# Patient Record
(demographics unavailable — no encounter records)

---

## 2024-10-15 NOTE — PROCEDURE
[FreeTextEntry1] : See scanned PFT report = report reviewed in detail with patient Normal spirometry; no flattening of flow-volume loop noted; no bronchodilator response noted; normal lung volumes; moderate reduction in DLCO which corrects for [facility]

## 2024-10-15 NOTE — REVIEW OF SYSTEMS
[Recent Wt Gain (___ Lbs)] : ~T recent [unfilled] lb weight gain [Recent Wt Loss (___ Lbs)] : ~T recent [unfilled] lb weight loss [SOB on Exertion] : sob on exertion [GERD] : gerd [Menopause] : menopause [Obesity] : obesity [Cough] : cough

## 2024-10-15 NOTE — HISTORY OF PRESENT ILLNESS
[Never] : never [Wheezing] : wheezing [Nasal Passage Blockage (Stuffiness)] : edema [Nonspecific Pain, Swelling, And Stiffness] : chest pain [Fever] : fever [Difficulty Breathing During Exertion] : dyspnea on exertion [Feelings Of Weakness On Exertion] : exercise intolerance [Wt Loss ___ kg] : Recent [unfilled] kg(s) weight loss [Does not check] : The patient is not checking peak flow at home [2  -  Slight] : 2, slight [Class II - Mild Symptoms and Slight Limitations] : II [Reviewed no changes] : Reviewed no changes [Cough] : coughing [Wt Gain ___ kg] : No recent weight gain [More Frequent Use Needed Recently] : Patient reports no recent increase in frequency of [TextBox_4] : 74-year-old female comes in today for initial pulmonary evaluation for management of asthma. 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  She denies any history of pneumonia or pleurisy.  She reports that when growing up she was frequently ill with bronchitis.  However her bouts of bronchitis have stopped over the last 5 years.  She denies any history of tuberculosis, COPD, DVT, pulmonary embolism, lung cancer, or pneumothorax.  She reports that she was diagnosed with sleep apnea in 1991.  She is compliant with the use of a nocturnal appliance with sleep.  She denies any history of prior lung surgery.  She reports that she has had asthma since childhood.  She states that she has been aware of this diagnosis for at least 10 to 15 years.  She has never required intubation or hospitalization for asthma management.  She reports that she was once evaluated in the emergency room and treated for active asthma.  Her asthma tends to trigger in the setting of acute upper respiratory illnesses.  She does not feel that her asthma is triggered by allergens, exercise, pet dander, odors, smoke.  She is presently maintained on daily Singulair and Breo Ellipta.  She reports no excess usage of her rescue inhaler.  She reports that she is single.  She is a retired .  She denies any exposure to radiation, chemicals, or dusts such as asbestos.  She denies tobacco use.  She drinks alcohol socially.  She denies any drug use.  She has had recent travel to [place] over the last year.  She denies any pets in her home.  She reports that she is up-to-date with influenza, pneumococcal, and COVID vaccination.  She has not yet had RSV vaccination.  She is unsure of the timing of her last chest x-ray.  She does not believe that she has ever had a chest CT.  She reports PFTs 1 year ago.  She denies any edema, orthopnea, or PND.  She denies any nocturnal asthma.  She underwent bariatric surgery and has had 100 pound weight loss since 2008.  She denies any recent fevers/chills/sweats.  She denies any chest pain or palpitations.  She denies any chronic cough or hemoptysis.  She notices dyspnea on exertion with inclines.  Otherwise she denies any other dyspnea.  She has not had any recent wheezing and feels that her asthma is fairly well-controlled.  April 18, 2024 visit: Doing well.  Denies any chest pain.  Denies any cough or hemoptysis.  Denies any recent fevers or chills.  Weight stable.  Denies any increased shortness of breath or wheezing.  No other complaints today.   October 15, 2024 visit: Had been doing very well.  Developed recent acute URI.  Was seen in urgent care.  Treated with a course of doxycycline.  Feeling improved.  Continues to have deep barking cough intermittently.  Denies any sputum production or hemoptysis.  Denies any chest pain.  Denies any fevers or chills.  Has been dieting with weight loss.  Denies any edema or calf pain.  Asthma has been fairly stable without increased shortness of breath or wheezing.  No other complaints today. [de-identified] : Denies hemoptysis [ESS] : 0 [TextBox_42] : 12/23 [AdvancecareDate] : 10/24

## 2024-10-15 NOTE — PHYSICAL EXAM
[No Acute Distress] : no acute distress [Well Nourished] : well nourished [Well Groomed] : well groomed [Well Developed] : well developed [Normal Oropharynx] : normal oropharynx [Supple] : supple [No JVD] : no jvd [Normal Rate/Rhythm] : normal rate/rhythm [Normal S1, S2] : normal s1, s2 [No Resp Distress] : no resp distress [No Acc Muscle Use] : no acc muscle use [Normal Rhythm and Effort] : normal rhythm and effort [Clear to Auscultation Bilaterally] : clear to auscultation bilaterally [No Abnormalities] : no abnormalities [Benign] : benign [Normal Gait] : normal gait [No Clubbing] : no clubbing [No Cyanosis] : no cyanosis [No Edema] : no edema [FROM] : FROM [Normal Color/ Pigmentation] : normal color/ pigmentation [No Focal Deficits] : no focal deficits [Oriented x3] : oriented x3 [Normal Affect] : normal affect [TextBox_11] : No sinus tenderness; pupils reactive to light; extraocular muscles intact [TextBox_44] : No stridor noted [TextBox_54] : No cords [TextBox_68] : R = 16; good air entry; no wheezing noted

## 2024-10-15 NOTE — ASSESSMENT
[FreeTextEntry1] : Asthma Clinically stable despite recent URI Had recent chest CT = no evidence of bronchiectasis Had full PFTs today = see scanned report = report reviewed with patient Never smoker Vaccines reviewed Consider pulmonary rehab Continue weight control Cardiology follow-up Continue Breo Ellipta 1 puff daily Continue daily Singulair Carry albuterol MDI for as needed use  Recent acute URI Now with persistent postviral cough Chest x-ray declined Has completed course of doxycycline Wishes trial of benzonatate 100 mg 3 times daily as needed to control cough Will take prednisone 20 mg daily in a.m. with food for 5 days Low threshold to use nebulizer therapy She will call if her cough persists despite treatment   YEYO Uses nocturnal appliance Continue weight control Avoid alcohol Sleep with head of bed elevated  She wishes to return in follow-up within the next 4-6 months and as needed Will do PFTs with next visit She will call if her status changes or worsens or for any pulmonary issues and return to be seen immediately All of the above was discussed in detail with her and all questions were answered She verbally confirmed understanding of all of the above and agreement with the above plan

## 2025-04-15 NOTE — PROCEDURE
[FreeTextEntry1] : See scanned PFT report = report reviewed in detail with patient Normal spirometry; no flattening of flow-volume loop noted; normal lung volumes; moderate reduction in DLCO which corrects for VA

## 2025-04-15 NOTE — HISTORY OF PRESENT ILLNESS
[Never] : never [Wheezing] : wheezing [Nasal Passage Blockage (Stuffiness)] : edema [Nonspecific Pain, Swelling, And Stiffness] : chest pain [Fever] : fever [Difficulty Breathing During Exertion] : dyspnea on exertion [Feelings Of Weakness On Exertion] : exercise intolerance [Does not check] : The patient is not checking peak flow at home [2  -  Slight] : 2, slight [Class II - Mild Symptoms and Slight Limitations] : II [Reviewed no changes] : Reviewed no changes [Cough] : coughing [Wt Gain ___ kg] : No recent weight gain [Wt Loss ___ kg] : No recent weight loss [More Frequent Use Needed Recently] : Patient reports no recent increase in frequency of [TextBox_4] : 74-year-old female comes in today for initial pulmonary evaluation for management of asthma. 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  She denies any history of pneumonia or pleurisy.  She reports that when growing up she was frequently ill with bronchitis.  However her bouts of bronchitis have stopped over the last 5 years.  She denies any history of tuberculosis, COPD, DVT, pulmonary embolism, lung cancer, or pneumothorax.  She reports that she was diagnosed with sleep apnea in 1991.  She is compliant with the use of a nocturnal appliance with sleep.  She denies any history of prior lung surgery.  She reports that she has had asthma since childhood.  She states that she has been aware of this diagnosis for at least 10 to 15 years.  She has never required intubation or hospitalization for asthma management.  She reports that she was once evaluated in the emergency room and treated for active asthma.  Her asthma tends to trigger in the setting of acute upper respiratory illnesses.  She does not feel that her asthma is triggered by allergens, exercise, pet dander, odors, smoke.  She is presently maintained on daily Singulair and Breo Ellipta.  She reports no excess usage of her rescue inhaler.  She reports that she is single.  She is a retired .  She denies any exposure to radiation, chemicals, or dusts such as asbestos.  She denies tobacco use.  She drinks alcohol socially.  She denies any drug use.  She has had recent travel to [place] over the last year.  She denies any pets in her home.  She reports that she is up-to-date with influenza, pneumococcal, and COVID vaccination.  She has not yet had RSV vaccination.  She is unsure of the timing of her last chest x-ray.  She does not believe that she has ever had a chest CT.  She reports PFTs 1 year ago.  She denies any edema, orthopnea, or PND.  She denies any nocturnal asthma.  She underwent bariatric surgery and has had 100 pound weight loss since 2008.  She denies any recent fevers/chills/sweats.  She denies any chest pain or palpitations.  She denies any chronic cough or hemoptysis.  She notices dyspnea on exertion with inclines.  Otherwise she denies any other dyspnea.  She has not had any recent wheezing and feels that her asthma is fairly well-controlled.  April 18, 2024 visit: Doing well.  Denies any chest pain.  Denies any cough or hemoptysis.  Denies any recent fevers or chills.  Weight stable.  Denies any increased shortness of breath or wheezing.  No other complaints today.   October 15, 2024 visit: Had been doing very well.  Developed recent acute URI.  Was seen in urgent care.  Treated with a course of doxycycline.  Feeling improved.  Continues to have deep barking cough intermittently.  Denies any sputum production or hemoptysis.  Denies any chest pain.  Denies any fevers or chills.  Has been dieting with weight loss.  Denies any edema or calf pain.  Asthma has been fairly stable without increased shortness of breath or wheezing.  No other complaints today.  April 15, 2025 visit: Doing well.  Denies any chest pain.  Denies any cough/sputum production/hemoptysis.  Denies any recent fevers or chills.  Denies any edema or calf pain.  Reports that her asthma has been stable without any increased shortness of breath or wheezing.  Offers no other complaints today. [de-identified] : Denies hemoptysis [ESS] : 0 [TextBox_42] : 12/23 [AdvancecareDate] : 04/25

## 2025-04-15 NOTE — ASSESSMENT
[FreeTextEntry1] : Asthma Clinically stable Monitor chest CT Had full PFTs today = see scanned report = report reviewed with patient Never smoker Vaccines reviewed Consider pulmonary rehab Continue weight control Cardiology follow-up Continue Breo Ellipta 1 puff daily then switch to [place] 1 puff twice daily Continue daily Singulair Carry albuterol MDI for as needed use  YEYO Uses nocturnal appliance Continue weight control Avoid alcohol Sleep with head of bed elevated  She wishes to return in follow-up within the next 4-6 months and as needed Will do PFTs with next visit She will call if her status changes or worsens or for any pulmonary issues and return to be seen immediately All of the above was discussed in detail with her and all questions were answered She verbally confirmed understanding of all of the above and agreement with the above plan

## 2025-04-15 NOTE — HISTORY OF PRESENT ILLNESS
[Never] : never [Wheezing] : wheezing [Nasal Passage Blockage (Stuffiness)] : edema [Nonspecific Pain, Swelling, And Stiffness] : chest pain [Fever] : fever [Difficulty Breathing During Exertion] : dyspnea on exertion [Feelings Of Weakness On Exertion] : exercise intolerance [Does not check] : The patient is not checking peak flow at home [2  -  Slight] : 2, slight [Class II - Mild Symptoms and Slight Limitations] : II [Reviewed no changes] : Reviewed no changes [Cough] : coughing [Wt Gain ___ kg] : No recent weight gain [Wt Loss ___ kg] : No recent weight loss [More Frequent Use Needed Recently] : Patient reports no recent increase in frequency of [TextBox_4] : 74-year-old female comes in today for initial pulmonary evaluation for management of asthma. 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  She denies any history of pneumonia or pleurisy.  She reports that when growing up she was frequently ill with bronchitis.  However her bouts of bronchitis have stopped over the last 5 years.  She denies any history of tuberculosis, COPD, DVT, pulmonary embolism, lung cancer, or pneumothorax.  She reports that she was diagnosed with sleep apnea in 1991.  She is compliant with the use of a nocturnal appliance with sleep.  She denies any history of prior lung surgery.  She reports that she has had asthma since childhood.  She states that she has been aware of this diagnosis for at least 10 to 15 years.  She has never required intubation or hospitalization for asthma management.  She reports that she was once evaluated in the emergency room and treated for active asthma.  Her asthma tends to trigger in the setting of acute upper respiratory illnesses.  She does not feel that her asthma is triggered by allergens, exercise, pet dander, odors, smoke.  She is presently maintained on daily Singulair and Breo Ellipta.  She reports no excess usage of her rescue inhaler.  She reports that she is single.  She is a retired .  She denies any exposure to radiation, chemicals, or dusts such as asbestos.  She denies tobacco use.  She drinks alcohol socially.  She denies any drug use.  She has had recent travel to [place] over the last year.  She denies any pets in her home.  She reports that she is up-to-date with influenza, pneumococcal, and COVID vaccination.  She has not yet had RSV vaccination.  She is unsure of the timing of her last chest x-ray.  She does not believe that she has ever had a chest CT.  She reports PFTs 1 year ago.  She denies any edema, orthopnea, or PND.  She denies any nocturnal asthma.  She underwent bariatric surgery and has had 100 pound weight loss since 2008.  She denies any recent fevers/chills/sweats.  She denies any chest pain or palpitations.  She denies any chronic cough or hemoptysis.  She notices dyspnea on exertion with inclines.  Otherwise she denies any other dyspnea.  She has not had any recent wheezing and feels that her asthma is fairly well-controlled.  April 18, 2024 visit: Doing well.  Denies any chest pain.  Denies any cough or hemoptysis.  Denies any recent fevers or chills.  Weight stable.  Denies any increased shortness of breath or wheezing.  No other complaints today.   October 15, 2024 visit: Had been doing very well.  Developed recent acute URI.  Was seen in urgent care.  Treated with a course of doxycycline.  Feeling improved.  Continues to have deep barking cough intermittently.  Denies any sputum production or hemoptysis.  Denies any chest pain.  Denies any fevers or chills.  Has been dieting with weight loss.  Denies any edema or calf pain.  Asthma has been fairly stable without increased shortness of breath or wheezing.  No other complaints today.  April 15, 2025 visit: Doing well.  Denies any chest pain.  Denies any cough/sputum production/hemoptysis.  Denies any recent fevers or chills.  Denies any edema or calf pain.  Reports that her asthma has been stable without any increased shortness of breath or wheezing.  Offers no other complaints today. [de-identified] : Denies hemoptysis [ESS] : 0 [TextBox_42] : 12/23 [AdvancecareDate] : 04/25